# Patient Record
Sex: FEMALE | Race: BLACK OR AFRICAN AMERICAN | Employment: FULL TIME
[De-identification: names, ages, dates, MRNs, and addresses within clinical notes are randomized per-mention and may not be internally consistent; named-entity substitution may affect disease eponyms.]

---

## 2023-07-03 ENCOUNTER — OFFICE VISIT (OUTPATIENT)
Dept: PRIMARY CARE CLINIC | Facility: CLINIC | Age: 23
End: 2023-07-03
Payer: COMMERCIAL

## 2023-07-03 VITALS
DIASTOLIC BLOOD PRESSURE: 73 MMHG | HEART RATE: 70 BPM | HEIGHT: 65 IN | TEMPERATURE: 97.8 F | OXYGEN SATURATION: 99 % | BODY MASS INDEX: 24.78 KG/M2 | SYSTOLIC BLOOD PRESSURE: 110 MMHG | WEIGHT: 148.7 LBS

## 2023-07-03 DIAGNOSIS — E55.9 VITAMIN D DEFICIENCY: ICD-10-CM

## 2023-07-03 DIAGNOSIS — Z83.3 FAMILY HISTORY OF DIABETES MELLITUS IN MATERNAL GRANDMOTHER: ICD-10-CM

## 2023-07-03 DIAGNOSIS — Z86.69 HX OF MIGRAINE HEADACHES: ICD-10-CM

## 2023-07-03 DIAGNOSIS — Z86.718 HISTORY OF BLOOD CLOT IN BRAIN: ICD-10-CM

## 2023-07-03 DIAGNOSIS — Z76.89 ENCOUNTER TO ESTABLISH CARE: Primary | ICD-10-CM

## 2023-07-03 DIAGNOSIS — Z13.220 SCREENING FOR LIPID DISORDERS: ICD-10-CM

## 2023-07-03 PROBLEM — N60.09 BREAST CYST: Status: ACTIVE | Noted: 2020-07-03

## 2023-07-03 PROBLEM — I66.9 BLOOD CLOTS IN BRAIN: Status: ACTIVE | Noted: 2019-07-03

## 2023-07-03 LAB
25(OH)D3 SERPL-MCNC: 21.4 NG/ML (ref 30–100)
ALBUMIN SERPL-MCNC: 3.9 G/DL (ref 3.5–5)
ALBUMIN/GLOB SERPL: 1 (ref 0.4–1.6)
ALP SERPL-CCNC: 69 U/L (ref 50–136)
ALT SERPL-CCNC: 38 U/L (ref 12–65)
ANION GAP SERPL CALC-SCNC: 6 MMOL/L (ref 2–11)
AST SERPL-CCNC: 21 U/L (ref 15–37)
BASOPHILS # BLD: 0 K/UL (ref 0–0.2)
BASOPHILS NFR BLD: 1 % (ref 0–2)
BILIRUB SERPL-MCNC: 0.5 MG/DL (ref 0.2–1.1)
BUN SERPL-MCNC: 15 MG/DL (ref 6–23)
CALCIUM SERPL-MCNC: 10.1 MG/DL (ref 8.3–10.4)
CHLORIDE SERPL-SCNC: 107 MMOL/L (ref 101–110)
CHOLEST SERPL-MCNC: 207 MG/DL
CO2 SERPL-SCNC: 26 MMOL/L (ref 21–32)
CREAT SERPL-MCNC: 0.9 MG/DL (ref 0.6–1)
DIFFERENTIAL METHOD BLD: ABNORMAL
EOSINOPHIL # BLD: 0.1 K/UL (ref 0–0.8)
EOSINOPHIL NFR BLD: 1 % (ref 0.5–7.8)
ERYTHROCYTE [DISTWIDTH] IN BLOOD BY AUTOMATED COUNT: 11.4 % (ref 11.9–14.6)
GLOBULIN SER CALC-MCNC: 3.8 G/DL (ref 2.8–4.5)
GLUCOSE SERPL-MCNC: 90 MG/DL (ref 65–100)
HCT VFR BLD AUTO: 42.6 % (ref 35.8–46.3)
HDLC SERPL-MCNC: 84 MG/DL (ref 40–60)
HDLC SERPL: 2.5
HGB BLD-MCNC: 14.2 G/DL (ref 11.7–15.4)
IMM GRANULOCYTES # BLD AUTO: 0 K/UL (ref 0–0.5)
IMM GRANULOCYTES NFR BLD AUTO: 0 % (ref 0–5)
LDLC SERPL CALC-MCNC: 112.4 MG/DL
LYMPHOCYTES # BLD: 2.9 K/UL (ref 0.5–4.6)
LYMPHOCYTES NFR BLD: 33 % (ref 13–44)
MCH RBC QN AUTO: 32.2 PG (ref 26.1–32.9)
MCHC RBC AUTO-ENTMCNC: 33.3 G/DL (ref 31.4–35)
MCV RBC AUTO: 96.6 FL (ref 82–102)
MONOCYTES # BLD: 0.4 K/UL (ref 0.1–1.3)
MONOCYTES NFR BLD: 5 % (ref 4–12)
NEUTS SEG # BLD: 5.4 K/UL (ref 1.7–8.2)
NEUTS SEG NFR BLD: 60 % (ref 43–78)
NRBC # BLD: 0 K/UL (ref 0–0.2)
PLATELET # BLD AUTO: 271 K/UL (ref 150–450)
PMV BLD AUTO: 10.5 FL (ref 9.4–12.3)
POTASSIUM SERPL-SCNC: 3.8 MMOL/L (ref 3.5–5.1)
PROT SERPL-MCNC: 7.7 G/DL (ref 6.3–8.2)
RBC # BLD AUTO: 4.41 M/UL (ref 4.05–5.2)
SODIUM SERPL-SCNC: 139 MMOL/L (ref 133–143)
TRIGL SERPL-MCNC: 53 MG/DL (ref 35–150)
VLDLC SERPL CALC-MCNC: 10.6 MG/DL (ref 6–23)
WBC # BLD AUTO: 8.8 K/UL (ref 4.3–11.1)

## 2023-07-03 PROCEDURE — G8420 CALC BMI NORM PARAMETERS: HCPCS | Performed by: FAMILY MEDICINE

## 2023-07-03 PROCEDURE — G8427 DOCREV CUR MEDS BY ELIG CLIN: HCPCS | Performed by: FAMILY MEDICINE

## 2023-07-03 PROCEDURE — 99203 OFFICE O/P NEW LOW 30 MIN: CPT | Performed by: FAMILY MEDICINE

## 2023-07-03 PROCEDURE — 1036F TOBACCO NON-USER: CPT | Performed by: FAMILY MEDICINE

## 2023-07-03 RX ORDER — ASPIRIN 81 MG/1
81 TABLET ORAL DAILY
COMMUNITY

## 2023-07-03 SDOH — ECONOMIC STABILITY: INCOME INSECURITY: HOW HARD IS IT FOR YOU TO PAY FOR THE VERY BASICS LIKE FOOD, HOUSING, MEDICAL CARE, AND HEATING?: NOT VERY HARD

## 2023-07-03 SDOH — HEALTH STABILITY: PHYSICAL HEALTH: ON AVERAGE, HOW MANY DAYS PER WEEK DO YOU ENGAGE IN MODERATE TO STRENUOUS EXERCISE (LIKE A BRISK WALK)?: 3 DAYS

## 2023-07-03 SDOH — HEALTH STABILITY: PHYSICAL HEALTH: ON AVERAGE, HOW MANY MINUTES DO YOU ENGAGE IN EXERCISE AT THIS LEVEL?: 40 MIN

## 2023-07-03 SDOH — ECONOMIC STABILITY: FOOD INSECURITY: WITHIN THE PAST 12 MONTHS, YOU WORRIED THAT YOUR FOOD WOULD RUN OUT BEFORE YOU GOT MONEY TO BUY MORE.: NEVER TRUE

## 2023-07-03 SDOH — ECONOMIC STABILITY: HOUSING INSECURITY
IN THE LAST 12 MONTHS, WAS THERE A TIME WHEN YOU DID NOT HAVE A STEADY PLACE TO SLEEP OR SLEPT IN A SHELTER (INCLUDING NOW)?: NO

## 2023-07-03 SDOH — ECONOMIC STABILITY: FOOD INSECURITY: WITHIN THE PAST 12 MONTHS, THE FOOD YOU BOUGHT JUST DIDN'T LAST AND YOU DIDN'T HAVE MONEY TO GET MORE.: NEVER TRUE

## 2023-07-03 ASSESSMENT — PATIENT HEALTH QUESTIONNAIRE - PHQ9
SUM OF ALL RESPONSES TO PHQ QUESTIONS 1-9: 0
1. LITTLE INTEREST OR PLEASURE IN DOING THINGS: 0
SUM OF ALL RESPONSES TO PHQ9 QUESTIONS 1 & 2: 0
2. FEELING DOWN, DEPRESSED OR HOPELESS: 0
SUM OF ALL RESPONSES TO PHQ QUESTIONS 1-9: 0

## 2023-07-03 ASSESSMENT — SOCIAL DETERMINANTS OF HEALTH (SDOH)
WITHIN THE LAST YEAR, HAVE YOU BEEN HUMILIATED OR EMOTIONALLY ABUSED IN OTHER WAYS BY YOUR PARTNER OR EX-PARTNER?: NO
WITHIN THE LAST YEAR, HAVE YOU BEEN AFRAID OF YOUR PARTNER OR EX-PARTNER?: NO
WITHIN THE LAST YEAR, HAVE TO BEEN RAPED OR FORCED TO HAVE ANY KIND OF SEXUAL ACTIVITY BY YOUR PARTNER OR EX-PARTNER?: NO
WITHIN THE LAST YEAR, HAVE YOU BEEN KICKED, HIT, SLAPPED, OR OTHERWISE PHYSICALLY HURT BY YOUR PARTNER OR EX-PARTNER?: NO

## 2023-07-03 NOTE — PROGRESS NOTES
Mouth/Throat:      Mouth: Mucous membranes are moist.   Eyes:      Extraocular Movements: Extraocular movements intact. Pupils: Pupils are equal, round, and reactive to light. Cardiovascular:      Rate and Rhythm: Normal rate and regular rhythm. Pulses: Normal pulses. Pulmonary:      Effort: Pulmonary effort is normal.      Breath sounds: Normal breath sounds. Abdominal:      General: Abdomen is flat. Palpations: Abdomen is soft. Musculoskeletal:         General: Normal range of motion. Right shoulder: Normal.      Left shoulder: Tenderness (mild pain around the boarder of the scapula) present. Cervical back: Normal range of motion and neck supple. Skin:     General: Skin is warm and dry. Neurological:      Mental Status: She is alert. Psychiatric:         Mood and Affect: Mood normal.         Behavior: Behavior normal.         PHQ:  PHQ-9  7/3/2023   Little interest or pleasure in doing things 0   Feeling down, depressed, or hopeless 0   PHQ-2 Score 0   PHQ-9 Total Score 0       IMPRESSION/PLAN     Diagnosis Orders   1. Encounter to establish care  CBC with Auto Differential    Comprehensive Metabolic Panel    Tiffany Diaz MD, OB/GYN, [place]      2. History of blood clot in brain  [facility] Elizabet Flowers MD, Neurology, [facility] [address] [place] [address] Hematology & Oncology      3. Family history of diabetes mellitus in maternal grandmother  Hemoglobin A1C      4. Vitamin D deficiency  Vitamin D 25 Hydroxy      5. Hx of migraine headaches  [facility] Elizabet Flowers MD, Neurology, [facility]      6. Screening for lipid disorders  Lipid Panel          Follow up and Dispositions:  Return in about 1 year (around 7/3/2024). Patient will continue current medications. Reviewed medications and side effects in detail. Will check the above labs. Reviewed diet, exercise and weight control. Zev Hernandez MD    Dictated using voice recognition software.  Proofread,

## 2023-07-04 DIAGNOSIS — E78.2 MIXED HYPERLIPIDEMIA: ICD-10-CM

## 2023-07-04 DIAGNOSIS — E55.9 VITAMIN D DEFICIENCY: Primary | ICD-10-CM

## 2023-07-04 LAB
EST. AVERAGE GLUCOSE BLD GHB EST-MCNC: 94 MG/DL
HBA1C MFR BLD: 4.9 % (ref 4.8–5.6)

## 2023-07-04 RX ORDER — ERGOCALCIFEROL 1.25 MG/1
50000 CAPSULE ORAL WEEKLY
Qty: 12 CAPSULE | Refills: 1 | Status: SHIPPED | OUTPATIENT
Start: 2023-07-04

## 2023-07-05 NOTE — RESULT ENCOUNTER NOTE
1. Vitamin D: low at 21.4 - Vitamin D has shown protection against colon cancer, prostate cancer, breast cancer, Alzheimer's disease and keeps the immune system strong. Can start weekly vitamin D. Prescription has been sent to the pharmacy. 2. Cholesterol: 207; LDL: 112.4; HDL: 84 - cholesterol slightly elevated. HDL (good cholesterol) elevated which is good. It will help protect the blood vessels. Will continue to monitor. Remainder of your labs are good.

## 2023-08-04 ENCOUNTER — OFFICE VISIT (OUTPATIENT)
Dept: OBGYN CLINIC | Age: 23
End: 2023-08-04
Payer: COMMERCIAL

## 2023-08-04 VITALS
HEIGHT: 65 IN | DIASTOLIC BLOOD PRESSURE: 70 MMHG | WEIGHT: 148 LBS | BODY MASS INDEX: 24.66 KG/M2 | SYSTOLIC BLOOD PRESSURE: 112 MMHG

## 2023-08-04 DIAGNOSIS — Z01.419 WELL WOMAN EXAM WITH ROUTINE GYNECOLOGICAL EXAM: Primary | ICD-10-CM

## 2023-08-04 DIAGNOSIS — Z12.4 PAP SMEAR FOR CERVICAL CANCER SCREENING: ICD-10-CM

## 2023-08-04 PROCEDURE — 99385 PREV VISIT NEW AGE 18-39: CPT | Performed by: OBSTETRICS & GYNECOLOGY

## 2023-08-04 RX ORDER — ACETAMINOPHEN AND CODEINE PHOSPHATE 120; 12 MG/5ML; MG/5ML
1 SOLUTION ORAL DAILY
Qty: 28 TABLET | Refills: 12 | Status: SHIPPED | OUTPATIENT
Start: 2023-08-04

## 2023-08-09 LAB
CYTOLOGIST CVX/VAG CYTO: NORMAL
CYTOLOGY CVX/VAG DOC THIN PREP: NORMAL
HPV REFLEX: NORMAL
Lab: NORMAL
PATH REPORT.FINAL DX SPEC: NORMAL
STAT OF ADQ CVX/VAG CYTO-IMP: NORMAL

## 2023-08-29 ENCOUNTER — TELEPHONE (OUTPATIENT)
Dept: ONCOLOGY | Age: 23
End: 2023-08-29

## 2023-08-29 NOTE — TELEPHONE ENCOUNTER
Physician provider: Kirstie Yang MD  Reason for today's call: Provider infomation  Last office visit: n/a       Pt called to provide info from previous Provider for upcoming 09.05.23 appt. Her previous Provider 's name is: Chikis Mcneil, Phd and the contact number is: [phone]. Fax number is:  [phone]. Address is: [address] [place] [address].

## 2023-09-05 ENCOUNTER — HOSPITAL ENCOUNTER (OUTPATIENT)
Dept: LAB | Age: 23
Discharge: HOME | End: 2023-09-08
Payer: COMMERCIAL

## 2023-09-05 ENCOUNTER — OFFICE VISIT (OUTPATIENT)
Dept: ONCOLOGY | Age: 23
End: 2023-09-05
Payer: COMMERCIAL

## 2023-09-05 VITALS
WEIGHT: 154 LBS | HEIGHT: 66 IN | SYSTOLIC BLOOD PRESSURE: 113 MMHG | TEMPERATURE: 98.6 F | DIASTOLIC BLOOD PRESSURE: 71 MMHG | HEART RATE: 86 BPM | BODY MASS INDEX: 24.75 KG/M2 | RESPIRATION RATE: 14 BRPM | OXYGEN SATURATION: 100 %

## 2023-09-05 DIAGNOSIS — Z86.718 HISTORY OF BLOOD CLOT IN BRAIN: Primary | ICD-10-CM

## 2023-09-05 DIAGNOSIS — Z86.718 HISTORY OF BLOOD CLOT IN BRAIN: ICD-10-CM

## 2023-09-05 DIAGNOSIS — I66.9 BLOOD CLOTS IN BRAIN: ICD-10-CM

## 2023-09-05 LAB
ALBUMIN SERPL-MCNC: 3.6 G/DL (ref 3.5–5)
ALBUMIN/GLOB SERPL: 0.9 (ref 0.4–1.6)
ALP SERPL-CCNC: 71 U/L (ref 50–136)
ALT SERPL-CCNC: 24 U/L (ref 12–65)
ANION GAP SERPL CALC-SCNC: 4 MMOL/L (ref 2–11)
APTT 1H P INC PPP: NORMAL SEC
APTT IMM NP PPP: NORMAL SEC
APTT PPP: 29.4 SEC (ref 23.4–34.5)
APTT PPP: 29.7 SEC (ref 24.5–34.2)
AST SERPL-CCNC: 14 U/L (ref 15–37)
BASOPHILS # BLD: 0 K/UL (ref 0–0.2)
BASOPHILS NFR BLD: 1 % (ref 0–2)
BILIRUB SERPL-MCNC: 0.3 MG/DL (ref 0.2–1.1)
BUN SERPL-MCNC: 11 MG/DL (ref 6–23)
CALCIUM SERPL-MCNC: 9.4 MG/DL (ref 8.3–10.4)
CHLORIDE SERPL-SCNC: 110 MMOL/L (ref 101–110)
CO2 SERPL-SCNC: 28 MMOL/L (ref 21–32)
CREAT SERPL-MCNC: 0.8 MG/DL (ref 0.6–1)
D DIMER PPP FEU-MCNC: 0.45 UG/ML(FEU)
DIFFERENTIAL METHOD BLD: ABNORMAL
EOSINOPHIL # BLD: 0.1 K/UL (ref 0–0.8)
EOSINOPHIL NFR BLD: 1 % (ref 0.5–7.8)
ERYTHROCYTE [DISTWIDTH] IN BLOOD BY AUTOMATED COUNT: 11.1 % (ref 11.9–14.6)
FERRITIN SERPL-MCNC: 76 NG/ML (ref 8–388)
FIBRINOGEN PPP-MCNC: 355 MG/DL (ref 190–501)
GLOBULIN SER CALC-MCNC: 4.1 G/DL (ref 2.8–4.5)
GLUCOSE SERPL-MCNC: 92 MG/DL (ref 65–100)
HCT VFR BLD AUTO: 41.6 % (ref 35.8–46.3)
HGB BLD-MCNC: 13.8 G/DL (ref 11.7–15.4)
IMM GRANULOCYTES # BLD AUTO: 0 K/UL (ref 0–0.5)
IMM GRANULOCYTES NFR BLD AUTO: 0 % (ref 0–5)
INR PPP: 1
IRON SATN MFR SERPL: 32 %
IRON SERPL-MCNC: 108 UG/DL (ref 35–150)
LYMPHOCYTES # BLD: 2.3 K/UL (ref 0.5–4.6)
LYMPHOCYTES NFR BLD: 27 % (ref 13–44)
Lab: NORMAL
MCH RBC QN AUTO: 31.4 PG (ref 26.1–32.9)
MCHC RBC AUTO-ENTMCNC: 33.2 G/DL (ref 31.4–35)
MCV RBC AUTO: 94.5 FL (ref 82–102)
MONOCYTES # BLD: 0.3 K/UL (ref 0.1–1.3)
MONOCYTES NFR BLD: 4 % (ref 4–12)
NEUTS SEG # BLD: 5.9 K/UL (ref 1.7–8.2)
NEUTS SEG NFR BLD: 68 % (ref 43–78)
NRBC # BLD: 0 K/UL (ref 0–0.2)
PLATELET # BLD AUTO: 256 K/UL (ref 150–450)
PMV BLD AUTO: 9.6 FL (ref 9.4–12.3)
POTASSIUM SERPL-SCNC: 3.9 MMOL/L (ref 3.5–5.1)
PROT SERPL-MCNC: 7.7 G/DL (ref 6.3–8.2)
PROTHROMBIN TIME: 13.8 SEC (ref 12.6–14.3)
PTT MIXING STUDY: NORMAL
RBC # BLD AUTO: 4.4 M/UL (ref 4.05–5.2)
REFERENCE LAB: NORMAL
SODIUM SERPL-SCNC: 142 MMOL/L (ref 133–143)
THROMBIN TIME: 18.4 SECS (ref 15.4–17.9)
TIBC SERPL-MCNC: 335 UG/DL (ref 250–450)
WBC # BLD AUTO: 8.6 K/UL (ref 4.3–11.1)

## 2023-09-05 PROCEDURE — 80053 COMPREHEN METABOLIC PANEL: CPT

## 2023-09-05 PROCEDURE — 85730 THROMBOPLASTIN TIME PARTIAL: CPT

## 2023-09-05 PROCEDURE — 85670 THROMBIN TIME PLASMA: CPT

## 2023-09-05 PROCEDURE — 88184 FLOWCYTOMETRY/ TC 1 MARKER: CPT

## 2023-09-05 PROCEDURE — 85379 FIBRIN DEGRADATION QUANT: CPT

## 2023-09-05 PROCEDURE — 1036F TOBACCO NON-USER: CPT | Performed by: PEDIATRICS

## 2023-09-05 PROCEDURE — 85025 COMPLETE CBC W/AUTO DIFF WBC: CPT

## 2023-09-05 PROCEDURE — G8419 CALC BMI OUT NRM PARAM NOF/U: HCPCS | Performed by: PEDIATRICS

## 2023-09-05 PROCEDURE — 83090 ASSAY OF HOMOCYSTEINE: CPT

## 2023-09-05 PROCEDURE — 85305 CLOT INHIBIT PROT S TOTAL: CPT

## 2023-09-05 PROCEDURE — 85384 FIBRINOGEN ACTIVITY: CPT

## 2023-09-05 PROCEDURE — 85306 CLOT INHIBIT PROT S FREE: CPT

## 2023-09-05 PROCEDURE — 85240 CLOT FACTOR VIII AHG 1 STAGE: CPT

## 2023-09-05 PROCEDURE — 85610 PROTHROMBIN TIME: CPT

## 2023-09-05 PROCEDURE — 36415 COLL VENOUS BLD VENIPUNCTURE: CPT

## 2023-09-05 PROCEDURE — 83540 ASSAY OF IRON: CPT

## 2023-09-05 PROCEDURE — 83550 IRON BINDING TEST: CPT

## 2023-09-05 PROCEDURE — G8428 CUR MEDS NOT DOCUMENT: HCPCS | Performed by: PEDIATRICS

## 2023-09-05 PROCEDURE — 88185 FLOWCYTOMETRY/TC ADD-ON: CPT

## 2023-09-05 PROCEDURE — 99205 OFFICE O/P NEW HI 60 MIN: CPT | Performed by: PEDIATRICS

## 2023-09-05 PROCEDURE — 82728 ASSAY OF FERRITIN: CPT

## 2023-09-05 PROCEDURE — 86038 ANTINUCLEAR ANTIBODIES: CPT

## 2023-09-05 ASSESSMENT — PATIENT HEALTH QUESTIONNAIRE - PHQ9
SUM OF ALL RESPONSES TO PHQ QUESTIONS 1-9: 0
2. FEELING DOWN, DEPRESSED OR HOPELESS: 0
SUM OF ALL RESPONSES TO PHQ QUESTIONS 1-9: 0

## 2023-09-05 NOTE — PROGRESS NOTES
HISTORY OF PRESENT ILLNESS  Josias Espana is a 20 y.o. y.o. female with Dural Venous thrombosis    ABSTRACT  Referral Diagnosis: History of blood clot in brain     Referring Provider: Cyndee Gómez MD     Primary Care Provider: Cyndee Gómez MD     Family History of Cancer/ Hematology Disorders: Maternal and paternal aunt with breast cancer      Presenting Symptoms: No relevant physical symptoms presently      Chronological History of Pertinent Events: Ms. Awilda Serrano is a 75-year-old black female:      07/13/19: Pt presented to the ED with a severe migraine headache   -CT brain without contrast identified acute thrombosis of the right trans versus and sigmoid sinus extending into the jugular vein (CE/Other Results)  -MRV cerebral w wo contrast showed occlusive dural venous thrombus extending from the proximal right transverse sinus through the sigmoid sinus and into the proximal right internal jugular vein (CE/Other Results)  -PT denied previous hx of clot, smoking, or birth control (she reported being on oral contraceptives for 1 month over 7 months ago)  -Hypercoag labs drawn with protein S activity low at 48, Protein C activity elevated at 145; Factor V Leiden, prothrombin gene mutation, cardiolipin and beta 2 glycoprotein Abs' were negative (CE/Labs)  -Started on heparin gtt   -Heme/onc consulted during admission and recommended repeating c/s AT III as outpatient      7/14/23: MRI brain without contrast showed loss of normal flow void in the right transverse/sigmoid sinuses compatible with the patient's history of dural venous sinus thrombosis; no evidence of infarct or intraparenchymal hemorrhage; and stable mucous retention cysts in the left sphenoid sinus  -Bilateral lower extremity venous doppler ultrasound was negative for DVT (CE/Other Results)     7/15/23: ECHO showed no significant abnormalities (CE/Other Results)     7/16/19: Discharged on coumadin with Lovenox bridge     7/19/19: Initial

## 2023-09-05 NOTE — PROGRESS NOTES
NEW PATIENT INTAKE    Referral Diagnosis: History of blood clot in brain    Referring Provider: Roxann Cruz MD    Primary Care Provider: Roxann Cruz MD    Family History of Cancer/ Hematology Disorders: Maternal and paternal aunt with breast cancer     Presenting Symptoms: No relevant physical symptoms presently     Chronological History of Pertinent Events: Ms. Marlin Arnett is a 77-year-old black female:     07/13/19: Pt presented to the ED with a severe migraine headache   -CT brain without contrast identified acute thrombosis of the right trans versus and sigmoid sinus extending into the jugular vein (CE/Other Results)  -MRV cerebral w wo contrast showed occlusive dural venous thrombus extending from the proximal right transverse sinus through the sigmoid sinus and into the proximal right internal jugular vein (CE/Other Results)  -PT denied previous hx of clot, smoking, or birth control (she reported being on oral contraceptives for 1 month over 7 months ago)  -Hypercoag labs drawn with protein S activity low at 48, Protein C activity elevated at 145; Factor V Leiden, prothrombin gene mutation, cardiolipin and beta 2 glycoprotein Abs' were negative (CE/Labs)  -Started on heparin gtt   -Heme/onc consulted during admission and recommended repeating c/s AT III as outpatient     7/14/23: MRI brain without contrast showed loss of normal flow void in the right transverse/sigmoid sinuses compatible with the patient's history of dural venous sinus thrombosis; no evidence of infarct or intraparenchymal hemorrhage; and stable mucous retention cysts in the left sphenoid sinus  -Bilateral lower extremity venous doppler ultrasound was negative for DVT (CE/Other Results)    7/15/23: ECHO showed no significant abnormalities (CE/Other Results)    7/16/19: Discharged on coumadin with Lovenox bridge    7/19/19: Initial Heme/onc consult with Dr. Luis[place] [id number] [facility]

## 2023-09-05 NOTE — PATIENT INSTRUCTIONS
Patient Instructions from Today's Visit    Reason for Visit:  New patient visit for history of blood clot (2019)    Recent move to [place]   Apt today to establish local hematology care     Currently on Aspirin 81mg by mouth daily     Plan:    Dr Ward Nageotte recommends a thrombophilia workup. This will be limited as not to repeat most of what was drawn historically. There are a few that will need to be repeated as they can change. Dr Ward Nageotte does not feel like annual MRIs. Ok to continue aspirin 81mg by mouth daily. Be careful as you are playing basketball which is technically a contact sport. If you get injured rylie head injury go to the ER. Referral to opthalmology (looking for papillary edema)     Risk factors for blood clots:  Main risk factors for blood clots: female, obesity, oral contraceptives (or large doses of estrogen), pregnancy, smoking, over 36years of age, elevated blood pressure (hypertension), dehydration, surgery, long travel, prolonged sedentary state, family hx, previous blood clot, genetic risk factors. Some of these risk factors cannot be changed, but some can be altered to help potentially prevent clotting. Keep weight healthy, stay hydrated, avoid long period of sitting during long travel or prolonged sedentary state, notify us if you have an upcoming surgery or upcoming long travel. We may decide to provide prophylactic anticoagulation.      Signs/symptoms of blood clots: pain or swelling in an extremity, pain in chest/difficulty breathing, unexplained fever, unexplained cough, severe headache      Follow Up:  1 year (cbc and cmp)    Recent Lab Results:  Labs after OV today     Treatment Summary has been discussed and given to patient: NA        -------------------------------------------------------------------------------------------------------------------  Please call our office at [phone] if you have any  of the following symptoms:   Fever of 100.5 or

## 2023-09-06 LAB
ANA SER QL: NEGATIVE
HCYS SERPL-SCNC: 7.4 UMOL/L (ref 0–14.5)

## 2023-09-07 LAB
FACT VIII ACT/NOR PPP: 225 % (ref 56–140)
PROT S ACT/NOR PPP: 35 % (ref 63–140)
PROT S AG ACT/NOR PPP IA: 63 % (ref 60–150)
PROT S FREE AG ACT/NOR PPP IA: 64 % (ref 61–136)

## 2024-09-06 DIAGNOSIS — Z86.718 HISTORY OF BLOOD CLOTS: Primary | ICD-10-CM
